# Patient Record
Sex: MALE | Race: OTHER | URBAN - METROPOLITAN AREA
[De-identification: names, ages, dates, MRNs, and addresses within clinical notes are randomized per-mention and may not be internally consistent; named-entity substitution may affect disease eponyms.]

---

## 2018-06-01 ENCOUNTER — OUTPATIENT (OUTPATIENT)
Dept: OUTPATIENT SERVICES | Facility: HOSPITAL | Age: 23
LOS: 1 days | End: 2018-06-01
Payer: MEDICAID

## 2018-06-01 PROCEDURE — G9001: CPT

## 2018-06-08 DIAGNOSIS — R69 ILLNESS, UNSPECIFIED: ICD-10-CM

## 2018-07-01 ENCOUNTER — OUTPATIENT (OUTPATIENT)
Dept: OUTPATIENT SERVICES | Facility: HOSPITAL | Age: 23
LOS: 1 days | End: 2018-07-01

## 2018-07-17 DIAGNOSIS — Z71.89 OTHER SPECIFIED COUNSELING: ICD-10-CM

## 2021-04-21 ENCOUNTER — OUTPATIENT (OUTPATIENT)
Dept: OUTPATIENT SERVICES | Facility: HOSPITAL | Age: 26
LOS: 1 days | Discharge: HOME | End: 2021-04-21
Payer: MEDICARE

## 2021-04-21 DIAGNOSIS — M25.532 PAIN IN LEFT WRIST: ICD-10-CM

## 2021-04-21 PROCEDURE — 73110 X-RAY EXAM OF WRIST: CPT | Mod: 26,LT

## 2021-12-01 ENCOUNTER — APPOINTMENT (OUTPATIENT)
Dept: FAMILY MEDICINE | Facility: CLINIC | Age: 26
End: 2021-12-01
Payer: COMMERCIAL

## 2021-12-01 ENCOUNTER — NON-APPOINTMENT (OUTPATIENT)
Age: 26
End: 2021-12-01

## 2021-12-01 VITALS
HEIGHT: 68 IN | HEART RATE: 105 BPM | BODY MASS INDEX: 22.77 KG/M2 | TEMPERATURE: 97.3 F | DIASTOLIC BLOOD PRESSURE: 82 MMHG | SYSTOLIC BLOOD PRESSURE: 142 MMHG | WEIGHT: 150.25 LBS

## 2021-12-01 DIAGNOSIS — Z13.29 ENCOUNTER FOR SCREENING FOR OTHER SUSPECTED ENDOCRINE DISORDER: ICD-10-CM

## 2021-12-01 PROCEDURE — 99385 PREV VISIT NEW AGE 18-39: CPT | Mod: 25

## 2021-12-01 PROCEDURE — 36415 COLL VENOUS BLD VENIPUNCTURE: CPT

## 2021-12-01 PROCEDURE — 93000 ELECTROCARDIOGRAM COMPLETE: CPT | Mod: 59

## 2021-12-01 PROCEDURE — 99203 OFFICE O/P NEW LOW 30 MIN: CPT | Mod: 25

## 2021-12-01 NOTE — HISTORY OF PRESENT ILLNESS
[FreeTextEntry1] : Establish care [de-identified] : Mr. MATTHEW MORRISON is a pleasant 26 year old male with PMH of HTN who comes in to the office for CPE and establish care. He was diagnosed with HTN whern he was 16 years old, got tests done in the past but I don't have those records. Patient was on lisinopril, last time 8 years ago. His blood pressures at home have been: SBP <140 but diastolic have been intermittently > 90. Patient doesn't bring the log\par His mother and maternal grandmother have HTN\par No other complaints today.\par \par Previous PC: ?\par

## 2021-12-01 NOTE — HEALTH RISK ASSESSMENT
[No] : In the past 12 months have you used drugs other than those required for medical reasons? 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

## 2021-12-01 NOTE — PHYSICAL EXAM
[No Acute Distress] : no acute distress [Well Nourished] : well nourished [Well Developed] : well developed [Well-Appearing] : well-appearing [Normal Voice/Communication] : normal voice/communication [Normal Sclera/Conjunctiva] : normal sclera/conjunctiva [PERRL] : pupils equal round and reactive to light [EOMI] : extraocular movements intact [Normal Outer Ear/Nose] : the outer ears and nose were normal in appearance [Normal Oropharynx] : the oropharynx was normal [No JVD] : no jugular venous distention [No Lymphadenopathy] : no lymphadenopathy [Supple] : supple [Thyroid Normal, No Nodules] : the thyroid was normal and there were no nodules present [No Respiratory Distress] : no respiratory distress  [No Accessory Muscle Use] : no accessory muscle use [Clear to Auscultation] : lungs were clear to auscultation bilaterally [Normal Rate] : normal rate  [Regular Rhythm] : with a regular rhythm [Normal S1, S2] : normal S1 and S2 [No Murmur] : no murmur heard [No Carotid Bruits] : no carotid bruits [No Abdominal Bruit] : a ~M bruit was not heard ~T in the abdomen [No Varicosities] : no varicosities [Pedal Pulses Present] : the pedal pulses are present [No Edema] : there was no peripheral edema [No Palpable Aorta] : no palpable aorta [No Extremity Clubbing/Cyanosis] : no extremity clubbing/cyanosis [Soft] : abdomen soft [Non Tender] : non-tender [Non-distended] : non-distended [No Masses] : no abdominal mass palpated [No HSM] : no HSM [Normal Bowel Sounds] : normal bowel sounds [Normal Posterior Cervical Nodes] : no posterior cervical lymphadenopathy [Normal Anterior Cervical Nodes] : no anterior cervical lymphadenopathy [No CVA Tenderness] : no CVA  tenderness [No Spinal Tenderness] : no spinal tenderness [No Joint Swelling] : no joint swelling [Grossly Normal Strength/Tone] : grossly normal strength/tone [No Rash] : no rash [Coordination Grossly Intact] : coordination grossly intact [No Focal Deficits] : no focal deficits [Normal Gait] : normal gait [Deep Tendon Reflexes (DTR)] : deep tendon reflexes were 2+ and symmetric [Normal Affect] : the affect was normal [Normal Insight/Judgement] : insight and judgment were intact [Normal TMs] : both tympanic membranes were normal [Normal Nasal Mucosa] : the nasal mucosa was normal [Urethral Meatus] : meatus normal [Penis Abnormality] : normal circumcised penis [Scrotum] : the scrotum was normal [Testes Tenderness] : no tenderness of the testes [Testes Mass (___cm)] : there were no testicular masses [Speech Grossly Normal] : speech grossly normal [Normal Mood] : the mood was normal

## 2021-12-02 LAB
ALBUMIN SERPL ELPH-MCNC: 5.1 G/DL
ALP BLD-CCNC: 48 U/L
ALT SERPL-CCNC: 17 U/L
ANION GAP SERPL CALC-SCNC: 16 MMOL/L
APPEARANCE: CLEAR
AST SERPL-CCNC: 16 U/L
BACTERIA: NEGATIVE
BASOPHILS # BLD AUTO: 0.04 K/UL
BASOPHILS NFR BLD AUTO: 0.8 %
BILIRUB SERPL-MCNC: 1 MG/DL
BILIRUBIN URINE: NEGATIVE
BLOOD URINE: NEGATIVE
BUN SERPL-MCNC: 13 MG/DL
CALCIUM SERPL-MCNC: 9.6 MG/DL
CHLORIDE SERPL-SCNC: 102 MMOL/L
CHOLEST SERPL-MCNC: 187 MG/DL
CO2 SERPL-SCNC: 22 MMOL/L
COLOR: YELLOW
CREAT SERPL-MCNC: 1.06 MG/DL
CREAT SPEC-SCNC: 346 MG/DL
EOSINOPHIL # BLD AUTO: 0.22 K/UL
EOSINOPHIL NFR BLD AUTO: 4.7 %
ESTIMATED AVERAGE GLUCOSE: 100 MG/DL
GLUCOSE QUALITATIVE U: NEGATIVE
GLUCOSE SERPL-MCNC: 77 MG/DL
HBA1C MFR BLD HPLC: 5.1 %
HCT VFR BLD CALC: 49.6 %
HCV AB SER QL: NONREACTIVE
HCV S/CO RATIO: 0.17 S/CO
HDLC SERPL-MCNC: 75 MG/DL
HGB BLD-MCNC: 15.8 G/DL
HIV1+2 AB SPEC QL IA.RAPID: NONREACTIVE
HYALINE CASTS: 2 /LPF
IMM GRANULOCYTES NFR BLD AUTO: 0.2 %
KETONES URINE: NEGATIVE
LDLC SERPL CALC-MCNC: 98 MG/DL
LEUKOCYTE ESTERASE URINE: NEGATIVE
LYMPHOCYTES # BLD AUTO: 1.59 K/UL
LYMPHOCYTES NFR BLD AUTO: 33.7 %
MAN DIFF?: NORMAL
MCHC RBC-ENTMCNC: 31.3 PG
MCHC RBC-ENTMCNC: 31.9 GM/DL
MCV RBC AUTO: 98.2 FL
MICROALBUMIN 24H UR DL<=1MG/L-MCNC: <1.2 MG/DL
MICROALBUMIN/CREAT 24H UR-RTO: NORMAL MG/G
MICROSCOPIC-UA: NORMAL
MONOCYTES # BLD AUTO: 0.3 K/UL
MONOCYTES NFR BLD AUTO: 6.4 %
NEUTROPHILS # BLD AUTO: 2.56 K/UL
NEUTROPHILS NFR BLD AUTO: 54.2 %
NITRITE URINE: NEGATIVE
NONHDLC SERPL-MCNC: 111 MG/DL
PH URINE: 6.5
PLATELET # BLD AUTO: 205 K/UL
POTASSIUM SERPL-SCNC: 4.6 MMOL/L
PROT SERPL-MCNC: 7.6 G/DL
PROTEIN URINE: NORMAL
RBC # BLD: 5.05 M/UL
RBC # FLD: 13.2 %
RED BLOOD CELLS URINE: 1 /HPF
SODIUM SERPL-SCNC: 140 MMOL/L
SPECIFIC GRAVITY URINE: 1.03
SQUAMOUS EPITHELIAL CELLS: 0 /HPF
TRIGL SERPL-MCNC: 70 MG/DL
TSH SERPL-ACNC: 1.1 UIU/ML
UROBILINOGEN URINE: NORMAL
WBC # FLD AUTO: 4.72 K/UL
WHITE BLOOD CELLS URINE: 1 /HPF

## 2021-12-08 ENCOUNTER — OUTPATIENT (OUTPATIENT)
Dept: OUTPATIENT SERVICES | Facility: HOSPITAL | Age: 26
LOS: 1 days | End: 2021-12-08

## 2021-12-08 ENCOUNTER — APPOINTMENT (OUTPATIENT)
Dept: ULTRASOUND IMAGING | Facility: CLINIC | Age: 26
End: 2021-12-08
Payer: COMMERCIAL

## 2021-12-08 DIAGNOSIS — I10 ESSENTIAL (PRIMARY) HYPERTENSION: ICD-10-CM

## 2021-12-08 PROCEDURE — 93975 VASCULAR STUDY: CPT | Mod: 26

## 2021-12-16 ENCOUNTER — NON-APPOINTMENT (OUTPATIENT)
Age: 26
End: 2021-12-16

## 2021-12-16 ENCOUNTER — APPOINTMENT (OUTPATIENT)
Dept: FAMILY MEDICINE | Facility: CLINIC | Age: 26
End: 2021-12-16
Payer: COMMERCIAL

## 2021-12-16 VITALS
HEIGHT: 68 IN | WEIGHT: 149 LBS | DIASTOLIC BLOOD PRESSURE: 90 MMHG | SYSTOLIC BLOOD PRESSURE: 148 MMHG | BODY MASS INDEX: 22.58 KG/M2

## 2021-12-16 DIAGNOSIS — F43.9 REACTION TO SEVERE STRESS, UNSPECIFIED: ICD-10-CM

## 2021-12-16 PROCEDURE — 93000 ELECTROCARDIOGRAM COMPLETE: CPT

## 2021-12-16 PROCEDURE — 99215 OFFICE O/P EST HI 40 MIN: CPT | Mod: 25

## 2021-12-16 NOTE — ASSESSMENT
[FreeTextEntry1] : \par In regards to hypertension\par Patient's blood pressure borderline elevated and reports that is intermittently high at home too.\par DIscussed avoid using salt, monitoring his blood pressures at home and bring the log on next visit.\par Continue amlodipine  \par Unremarkable Artery US,  UA, urine creat/alb ratio\par Advised to seek medical help emergently if systolic blood pressures (bottom number) is > 180 or diastolic blood pressure (bottom number) is >100  or if patient has  symptoms that include but are not limited to constant headaches, chest pain, palpitations, dizziness, lightheadedness\par \par Abnormal EKG reviewed: non specific abnormal T-waves, no abnormal ST changes. Asymptomatic.  Alarm symptoms discussed  including but not limited to chest pain, palpitations and I advised the patient to go to the emergency department if these symptoms appear. \par Referring to cardiology\par .\par \par Adjustment disorder with anxiety and stress\par Patient reports that for the past 4 months since he moved back to [place]\par Seems related to leaving away form his family. \par His PHQ-2 is 0 today\par Denies suicidal thoughts\par Could be related to his previous difficult childhood and teenager years?\par Offered therapy and/or medications, patient is note interested at this time. Explained that CBD is a good option for his current symptoms. Patient would rather to come back for following up at the office and will consider other options at a alter time\par Advised to find stress coping measures (exercise, yoga, etc)\par \par Return to care: within 1 month for follow up in anxiety/HTN or earlier if needed\par Call or return for any questions \par

## 2021-12-16 NOTE — HISTORY OF PRESENT ILLNESS
[FreeTextEntry1] : follow up [de-identified] : Mr. MATTHEW MORRISON is a pleasant 26 year old male with PMH of HTN who comes in to the office for following up in HTN and repeat EKG. He was diagnosed with HTN when he was 16 years old, had work up done but I don't have those records.\par I prescribed him amlodipine 2.5 mg last week, he was advised to increase it to 5 mg if his blood pressures were still high on the medication, which he did. He has been monitoring his blood pressures and have been at times in range and some times out of range.\par Patient was on lisinopril, last time 8 years ago.\par His mother and maternal grandmother have HTN\par His EKG was abnormal, he was asymptomatic. \par \par Patient reports that has been feeling anxious an stressed out for the past 4 months since he moved back to [place]. His mom and sister live in [place]. Denies feeling depressed, his PHQ-2 was negative last week. Denies suicidal thoughts. He reports that had a difficult childhood and teenager years with his step father, when he believes his blood pressure issues started.\par \par No other complaints today.\par \par Previous PC: ?

## 2021-12-16 NOTE — HEALTH RISK ASSESSMENT
[0] : 2) Feeling down, depressed, or hopeless: Not at all (0) [PHQ-2 Negative - No further assessment needed] : PHQ-2 Negative - No further assessment needed [[id number]] : 0

## 2021-12-27 ENCOUNTER — RX CHANGE (OUTPATIENT)
Age: 26
End: 2021-12-27

## 2022-01-12 ENCOUNTER — APPOINTMENT (OUTPATIENT)
Dept: FAMILY MEDICINE | Facility: CLINIC | Age: 27
End: 2022-01-12
Payer: COMMERCIAL

## 2022-01-12 VITALS
WEIGHT: 152 LBS | DIASTOLIC BLOOD PRESSURE: 70 MMHG | HEIGHT: 68 IN | BODY MASS INDEX: 23.04 KG/M2 | SYSTOLIC BLOOD PRESSURE: 120 MMHG

## 2022-01-12 PROCEDURE — 99214 OFFICE O/P EST MOD 30 MIN: CPT

## 2022-01-12 NOTE — ASSESSMENT
[FreeTextEntry1] : \par In regards to hypertension\par Patient's blood pressure borderline elevated and reports that is intermittently high at home too.\par DIscussed avoid using salt, monitoring his blood pressures at home and bring the log on next visit.\par Continue amlodipine  \par Pending to see cardio for abnormal EKG, has appointment for feb/2022\par \par Adjustment disorder with anxiety and stress\par Patient reports that for the past 4 months since he moved back to [place]\par Seems related to leaving away form his family. \par His PHQ-2 is 0 today\par Denies suicidal thoughts\par Could be related to his previous difficult childhood and teenager years?\par Offered again therapy and/or medications, patient is not interested at this time. Offered to follow up at the office instead\par Advised to find stress coping measures (exercise, yoga, etc)\par \par Return to care: within 3 months for follow up in anxiety/HTN or earlier if needed\par Call or return for any questions

## 2022-01-12 NOTE — PHYSICAL EXAM
[Coordination Grossly Intact] : coordination grossly intact [No Focal Deficits] : no focal deficits [Normal Gait] : normal gait [Speech Grossly Normal] : speech grossly normal [Normal Mood] : the mood was normal [Normal] : affect was normal and insight and judgment were intact

## 2022-01-12 NOTE — HISTORY OF PRESENT ILLNESS
[FreeTextEntry1] : follow up [de-identified] : Mr. MATTHEW MORRISON is a pleasant 26 year old male with PMH of HTN, adjustment disorder who comes in to the office for following up in HTN  He was started on amlodipine now titrated to 5 mg QD, he is tolerating it well. No complaints today. Denies suicidal thought.\par Pending to see cardio for abnormal EKG\par \par \par \par His mother and maternal grandmother have HTN\par His EKG was abnormal, he was asymptomatic. \par \par Patient reports that has been feeling anxious an stressed out for the past 4 months since he moved back to [place]. His mom and sister live in [place]. Denies feeling depressed, his PHQ-2 was negative last week. Denies suicidal thoughts. He reports that had a difficult childhood and teenager years with his step father, when he believes his blood pressure issues started.\par \par No other complaints today.\par \par Previous PC: ?

## 2022-01-12 NOTE — HEALTH RISK ASSESSMENT
[0] : 2) Feeling down, depressed, or hopeless: Not at all (0) [PHQ-2 Negative - No further assessment needed] : PHQ-2 Negative - No further assessment needed [[id number]] : 0

## 2022-02-09 ENCOUNTER — NON-APPOINTMENT (OUTPATIENT)
Age: 27
End: 2022-02-09

## 2022-02-09 ENCOUNTER — APPOINTMENT (OUTPATIENT)
Dept: CARDIOLOGY | Facility: CLINIC | Age: 27
End: 2022-02-09
Payer: COMMERCIAL

## 2022-02-09 VITALS
HEART RATE: 68 BPM | WEIGHT: 157 LBS | BODY MASS INDEX: 23.79 KG/M2 | RESPIRATION RATE: 16 BRPM | OXYGEN SATURATION: 98 % | DIASTOLIC BLOOD PRESSURE: 82 MMHG | SYSTOLIC BLOOD PRESSURE: 140 MMHG | HEIGHT: 68 IN

## 2022-02-09 DIAGNOSIS — Z13.1 ENCOUNTER FOR SCREENING FOR DIABETES MELLITUS: ICD-10-CM

## 2022-02-09 DIAGNOSIS — R94.31 ABNORMAL ELECTROCARDIOGRAM [ECG] [EKG]: ICD-10-CM

## 2022-02-09 DIAGNOSIS — Z82.49 FAMILY HISTORY OF ISCHEMIC HEART DISEASE AND OTHER DISEASES OF THE CIRCULATORY SYSTEM: ICD-10-CM

## 2022-02-09 DIAGNOSIS — Z13.220 ENCOUNTER FOR SCREENING FOR LIPOID DISORDERS: ICD-10-CM

## 2022-02-09 DIAGNOSIS — Z11.59 ENCOUNTER FOR SCREENING FOR OTHER VIRAL DISEASES: ICD-10-CM

## 2022-02-09 DIAGNOSIS — Z13.6 ENCOUNTER FOR SCREENING FOR CARDIOVASCULAR DISORDERS: ICD-10-CM

## 2022-02-09 DIAGNOSIS — Z11.4 ENCOUNTER FOR SCREENING FOR HUMAN IMMUNODEFICIENCY VIRUS [HIV]: ICD-10-CM

## 2022-02-09 PROCEDURE — 99244 OFF/OP CNSLTJ NEW/EST MOD 40: CPT

## 2022-02-09 PROCEDURE — 93000 ELECTROCARDIOGRAM COMPLETE: CPT

## 2022-02-09 NOTE — HISTORY OF PRESENT ILLNESS
[FreeTextEntry1] : Patient presents today for evaluation because he was recently seen by his new primary doctor and was noted to have an abnormal EKG and elevated blood pressure.  Patient apparently has had issues with hypertension for a number of years.  He was previously on medication but this had been stopped a long time ago.  His blood pressure was elevated in the office at his primary and he was started on amlodipine which was since increased to 5 mg.  He reports blood pressures in the 130s to 140s systolic when he checked at home prior to taking the medication.  He has not been checking it recently because he said he was getting very nervous when he checked it and it was high so he was told to stop checking it.  He has made a lot of changes to his diet over the last couple of months and has been exercising regularly and doing as much as he can to help his blood pressure nonpharmacologically.  He feels very well when he goes to the gym and exercise and has no symptoms or limitations.  He apparently had some kind of work-up in the past with regards to his EKG and his blood pressure but does not know who the doctor was in we are unable to get records.  Patient denies chest pain, shortness of breath, palpitations, orthopnea, presyncope, syncope.

## 2022-02-09 NOTE — ASSESSMENT
[FreeTextEntry1] : Renal Doppler December 8, 2021 showed no evidence of renal artery stenosis.\par \par EKG: Sinus rhythm with nonspecific T wave changes, predominantly biphasic T waves in the anterolateral and inferior leads.\par \par 27-year-old man with a past medical history of hypertension with a family history of hypertension who presents to me for evaluation of his elevated blood pressure and an abnormal EKG.  EKG findings may be consistent with his athleticism and an athletic heart.  Further evaluation to rule out any underlying cardiovascular disease is certainly important.  Blood pressure is a bit elevated here today but his medication was recently increased.  I will not make any additional changes for now but have asked him to start monitoring it at home.  He will take 2 readings and use the second reading.  Lipids are very well controlled and his blood work is otherwise unremarkable.  Ultrasound of the kidneys showed no evidence of renal artery stenosis.

## 2022-02-09 NOTE — DISCUSSION/SUMMARY
[FreeTextEntry1] : 1.  Check echocardiogram/stress test to evaluate his abnormal EKG.\par 2.  Continue amlodipine alone for hypertension for now.\par 3.  Monitor BP at home, keep a log and bring to f/u.\par 4.  Patient strongly encouraged on reducing salt intake.\par 5.  Encourage patient to continue with all of his healthy diet and exercise habits.\par 6.  Follow up here in two months.

## 2022-03-24 ENCOUNTER — APPOINTMENT (OUTPATIENT)
Dept: CARDIOLOGY | Facility: CLINIC | Age: 27
End: 2022-03-24
Payer: COMMERCIAL

## 2022-03-24 PROCEDURE — 93306 TTE W/DOPPLER COMPLETE: CPT

## 2022-03-24 PROCEDURE — 93015 CV STRESS TEST SUPVJ I&R: CPT

## 2022-04-05 ENCOUNTER — APPOINTMENT (OUTPATIENT)
Dept: CARDIOLOGY | Facility: CLINIC | Age: 27
End: 2022-04-05
Payer: COMMERCIAL

## 2022-04-05 VITALS
BODY MASS INDEX: 23.79 KG/M2 | RESPIRATION RATE: 16 BRPM | HEIGHT: 68 IN | DIASTOLIC BLOOD PRESSURE: 88 MMHG | WEIGHT: 157 LBS | SYSTOLIC BLOOD PRESSURE: 153 MMHG | HEART RATE: 77 BPM

## 2022-04-05 PROCEDURE — 99213 OFFICE O/P EST LOW 20 MIN: CPT

## 2022-04-05 NOTE — ASSESSMENT
[FreeTextEntry1] : Renal Doppler December 8, 2021 showed no evidence of renal artery stenosis.\par \par Echocardiogram March 24, 2022 demonstrated left ventricle normal size and function with ejection fraction of 55 to 60%.  Mild tricuspid regurgitation.  No significant structural abnormalities.\par \par Exercise stress test March 24, 2022 during which the patient exercised via a Chace protocol for 13 minutes and 20 seconds, totaling 14 METS.  Peak heart rate achieved was 184 bpm, presenting 96% of age-predicted maximum.  Blood pressure response to exercise was normal.  EKG showed horizontal ST depressions in the inferior leads concerning for ischemia with exercise.\par \par 27-year-old man with a past medical history of hypertension with a family history of hypertension who presented to me for evaluation of his elevated blood pressure and an abnormal EKG.   echocardiogram is unremarkable with a normal EF and no significant valve disease.  Stress testing shows an excellent exercise capacity but some horizontal ST depressions with exercise.  I have recommended stress echocardiogram to fully rule out ischemia which remains very unlikely.  Blood pressure at home appears to be controlled on amlodipine and I would not make any changes to his medication for now.  I have encouraged him to maintain all of his healthy habits.

## 2022-04-05 NOTE — DISCUSSION/SUMMARY
[FreeTextEntry1] : 1.  Check stress echocardiogram to evaluate ischemia given his abnormal stress test.\par 2.  Continue amlodipine alone for hypertension for now.\par 3.  Monitor BP at home, keep a log and bring to f/u.\par 4.  Patient strongly encouraged on reducing salt intake.\par 5.  Encourage patient to continue with all of his healthy diet and exercise habits.\par 6.  Follow up here in one year or as needed.

## 2022-04-05 NOTE — HISTORY OF PRESENT ILLNESS
[FreeTextEntry1] : Patient presents back today feeling generally well.  He does state that he occasionally notes some very brief palpitations that will occur.  This occurs once or twice a week and last for a second or 2 feeling like a few skipped beats.  Generally he feels like it happens when he is sitting or crouching.  There were no associated symptoms.  No dizziness or lightheadedness.  He reports no other physical symptoms at all.  Blood pressure at home of been in the 120s over 70s and he is tolerating amlodipine.  Patient denies chest pain, shortness of breath, orthopnea, presyncope, syncope.

## 2022-05-12 ENCOUNTER — APPOINTMENT (OUTPATIENT)
Dept: CARDIOLOGY | Facility: CLINIC | Age: 27
End: 2022-05-12
Payer: COMMERCIAL

## 2022-05-12 PROCEDURE — 93351 STRESS TTE COMPLETE: CPT

## 2022-05-12 RX ORDER — PERFLUTREN 6.52 MG/ML
6.52 INJECTION, SUSPENSION INTRAVENOUS
Qty: 4 | Refills: 0 | Status: COMPLETED | OUTPATIENT
Start: 2022-05-12

## 2022-05-12 RX ADMIN — PERFLUTREN MG/ML: 6.52 INJECTION, SUSPENSION INTRAVENOUS at 00:00

## 2022-05-19 ENCOUNTER — APPOINTMENT (OUTPATIENT)
Dept: FAMILY MEDICINE | Facility: CLINIC | Age: 27
End: 2022-05-19

## 2022-06-01 ENCOUNTER — RX RENEWAL (OUTPATIENT)
Age: 27
End: 2022-06-01

## 2022-06-15 ENCOUNTER — NON-APPOINTMENT (OUTPATIENT)
Age: 27
End: 2022-06-15

## 2022-09-06 ENCOUNTER — RX RENEWAL (OUTPATIENT)
Age: 27
End: 2022-09-06

## 2022-10-10 ENCOUNTER — RX CHANGE (OUTPATIENT)
Age: 27
End: 2022-10-10

## 2022-10-20 ENCOUNTER — APPOINTMENT (OUTPATIENT)
Dept: FAMILY MEDICINE | Facility: CLINIC | Age: 27
End: 2022-10-20

## 2022-10-20 VITALS
HEIGHT: 68 IN | HEART RATE: 79 BPM | SYSTOLIC BLOOD PRESSURE: 161 MMHG | BODY MASS INDEX: 24.1 KG/M2 | WEIGHT: 159 LBS | DIASTOLIC BLOOD PRESSURE: 82 MMHG

## 2022-10-20 PROCEDURE — 99214 OFFICE O/P EST MOD 30 MIN: CPT

## 2022-10-20 NOTE — HISTORY OF PRESENT ILLNESS
[FreeTextEntry1] : follow up [de-identified] : Mr. MATTHEW MORRISON is a pleasant 26 year old male with PMH of HTN, adjustment disorder who comes in to the office for following up in HTN  He is on amlodipine 5 mg QD, he is tolerating it well but has not followed up at this office since Jan/2022. Denies chest pain, headaches, shortness of breath, leg welling.\par No complaints today. Denies suicidal thought.\par Saw cardio for abnormal EKG, was told to continue current regimen.\par \par His adjustment disorder is resolved now, denies feeling depressed or suicidal thoughts.\par Previous PCP: ?\par Cardio: Yg

## 2022-10-20 NOTE — PLAN
[FreeTextEntry1] : \par In regards to hypertension\par Patient's blood pressure in target today\par DIscussed avoid using salt, monitoring his blood pressures at home and bring the log on next visit.\par Continue amlodipine 5 mg QD\par Advised to seek medical help emergently if systolic blood pressures (bottom number) is > 180 or diastolic blood pressure (bottom number) is >100  or if patient has  symptoms that include but are not limited to constant headaches, chest pain, palpitations, dizziness, lightheadedness\par \par Saw cardio for abnormal EKG, was told that everything was Ok and to follow up 1 year later\par \par Adjustment disorder with anxiety and stress\par Resolved\par His PHQ-2 is 0 today\par Denies suicidal thoughts\par \par Return to care: within 3 months for follow up in anxiety/HTN or earlier if needed\par Call or return for any questions

## 2022-10-20 NOTE — HEALTH RISK ASSESSMENT
[0] : 2) Feeling down, depressed, or hopeless: Not at all (0) [PHQ-2 Negative - No further assessment needed] : PHQ-2 Negative - No further assessment needed [[id number]] : 0

## 2022-12-05 ENCOUNTER — APPOINTMENT (OUTPATIENT)
Dept: FAMILY MEDICINE | Facility: CLINIC | Age: 27
End: 2022-12-05

## 2022-12-05 VITALS
DIASTOLIC BLOOD PRESSURE: 77 MMHG | HEIGHT: 68 IN | SYSTOLIC BLOOD PRESSURE: 137 MMHG | BODY MASS INDEX: 24.25 KG/M2 | HEART RATE: 97 BPM | RESPIRATION RATE: 16 BRPM | WEIGHT: 160 LBS

## 2022-12-05 DIAGNOSIS — Z23 ENCOUNTER FOR IMMUNIZATION: ICD-10-CM

## 2022-12-05 PROCEDURE — 90471 IMMUNIZATION ADMIN: CPT

## 2022-12-05 PROCEDURE — 36415 COLL VENOUS BLD VENIPUNCTURE: CPT

## 2022-12-05 PROCEDURE — 99395 PREV VISIT EST AGE 18-39: CPT | Mod: 25

## 2022-12-05 PROCEDURE — 90715 TDAP VACCINE 7 YRS/> IM: CPT

## 2022-12-05 NOTE — PLAN
[FreeTextEntry1] : \par In regards annual physical exam: \par Advised to use sunscreen \par GIven Tdap today, tolerated well\par Ordering CBC, CMP\par Depression screen: PHQ-2 test done, < 2 as noted above\par AUDIT-C test done, negative as noted above\par Advised to see optometrist once at year and dentist every 6 months \par \par In regards to hypertension\par Patient's blood pressure in adequate range. \par DIscussed avoid using salt, monitoring his blood pressures at home and bring the log on next visit.\par Continue Amlodipine 5 mg \par \par Adjustment disorder\par Symptoms resolved now, denies suicidal thoughts or depression symptoms at this time\par \par Return to care: within 3 months for follow up or earlier if needed\par Call or return for any questions

## 2022-12-05 NOTE — HEALTH RISK ASSESSMENT
[Never] : Never [No] : In the past 12 months have you used drugs other than those required for medical reasons? 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

## 2022-12-05 NOTE — HISTORY OF PRESENT ILLNESS
[FreeTextEntry1] : AWE [de-identified] : Mr. MATTHEW MORRISON is a pleasant 27 year old male with PMH of HTN, adjustment disorder who comes in to the office for AWE. Denies chest pain, headaches, shortness of breath, leg welling.\par No complaints today. Denies suicidal thought.\par Saw cardio for abnormal EKG, was told to continue current regimen.\par \par His adjustment disorder is resolved now, denies feeling depressed or suicidal thoughts.\par Previous PCP: ?\par Cardio: Yg

## 2022-12-05 NOTE — PHYSICAL EXAM
[No Acute Distress] : no acute distress [Well Nourished] : well nourished [Well Developed] : well developed [Well-Appearing] : well-appearing [Normal Sclera/Conjunctiva] : normal sclera/conjunctiva [PERRL] : pupils equal round and reactive to light [EOMI] : extraocular movements intact [Normal Outer Ear/Nose] : the outer ears and nose were normal in appearance [Normal Oropharynx] : the oropharynx was normal [No JVD] : no jugular venous distention [No Lymphadenopathy] : no lymphadenopathy [Supple] : supple [Thyroid Normal, No Nodules] : the thyroid was normal and there were no nodules present [No Respiratory Distress] : no respiratory distress  [No Accessory Muscle Use] : no accessory muscle use [Clear to Auscultation] : lungs were clear to auscultation bilaterally [Normal Rate] : normal rate  [Regular Rhythm] : with a regular rhythm [Normal S1, S2] : normal S1 and S2 [No Murmur] : no murmur heard [No Carotid Bruits] : no carotid bruits [No Abdominal Bruit] : a ~M bruit was not heard ~T in the abdomen [No Varicosities] : no varicosities [Pedal Pulses Present] : the pedal pulses are present [No Edema] : there was no peripheral edema [No Palpable Aorta] : no palpable aorta [No Extremity Clubbing/Cyanosis] : no extremity clubbing/cyanosis [Soft] : abdomen soft [Non Tender] : non-tender [Non-distended] : non-distended [No Masses] : no abdominal mass palpated [No HSM] : no HSM [Normal Bowel Sounds] : normal bowel sounds [Normal Posterior Cervical Nodes] : no posterior cervical lymphadenopathy [Normal Anterior Cervical Nodes] : no anterior cervical lymphadenopathy [No CVA Tenderness] : no CVA  tenderness [No Spinal Tenderness] : no spinal tenderness [No Joint Swelling] : no joint swelling [Grossly Normal Strength/Tone] : grossly normal strength/tone [No Rash] : no rash [Coordination Grossly Intact] : coordination grossly intact [No Focal Deficits] : no focal deficits [Normal Gait] : normal gait [Deep Tendon Reflexes (DTR)] : deep tendon reflexes were 2+ and symmetric [Normal Affect] : the affect was normal [Normal Insight/Judgement] : insight and judgment were intact [de-identified] : some wax in right ear, non obstructing the ear canal [de-identified] : declined

## 2022-12-06 LAB
ALBUMIN SERPL ELPH-MCNC: 4.8 G/DL
ALP BLD-CCNC: 58 U/L
ALT SERPL-CCNC: 12 U/L
ANION GAP SERPL CALC-SCNC: 12 MMOL/L
AST SERPL-CCNC: 17 U/L
BASOPHILS # BLD AUTO: 0.02 K/UL
BASOPHILS NFR BLD AUTO: 0.4 %
BILIRUB SERPL-MCNC: 0.9 MG/DL
BUN SERPL-MCNC: 13 MG/DL
CALCIUM SERPL-MCNC: 9.6 MG/DL
CHLORIDE SERPL-SCNC: 102 MMOL/L
CO2 SERPL-SCNC: 26 MMOL/L
CREAT SERPL-MCNC: 1.12 MG/DL
EGFR: 91 ML/MIN/1.73M2
EOSINOPHIL # BLD AUTO: 0.16 K/UL
EOSINOPHIL NFR BLD AUTO: 3.6 %
GLUCOSE SERPL-MCNC: 77 MG/DL
HCT VFR BLD CALC: 50 %
HGB BLD-MCNC: 15.9 G/DL
IMM GRANULOCYTES NFR BLD AUTO: 0.2 %
LYMPHOCYTES # BLD AUTO: 1.88 K/UL
LYMPHOCYTES NFR BLD AUTO: 41.9 %
MAN DIFF?: NORMAL
MCHC RBC-ENTMCNC: 31.2 PG
MCHC RBC-ENTMCNC: 31.8 GM/DL
MCV RBC AUTO: 98.2 FL
MONOCYTES # BLD AUTO: 0.32 K/UL
MONOCYTES NFR BLD AUTO: 7.1 %
NEUTROPHILS # BLD AUTO: 2.1 K/UL
NEUTROPHILS NFR BLD AUTO: 46.8 %
PLATELET # BLD AUTO: 173 K/UL
POTASSIUM SERPL-SCNC: 3.8 MMOL/L
PROT SERPL-MCNC: 7.4 G/DL
RBC # BLD: 5.09 M/UL
RBC # FLD: 12.6 %
SODIUM SERPL-SCNC: 140 MMOL/L
WBC # FLD AUTO: 4.49 K/UL

## 2022-12-21 NOTE — ASSESSMENT
[FreeTextEntry1] : \par In regards annual physical exam: \par Declines Flu and Tdap vaccines\par Ordering CBC, CMP, Hep C, HIV\par PHQ-2 test done, < 2 as noted above\par AUDIT-C test done, negative as noted above\par Advised to see optometrist once at year and dentist every 6 months \par \par In regards to hypertension\par Patient's blood pressure borderline elevated and reports that is intermittently high at home too.\par DIscussed avoid using salt, monitoring his blood pressures at home and bring the log on next visit.\par Abnormal EKG reviewed: non specific T=wave abnormalities but no acute ST abnormalities, patient without cardiac symptoms. \par Will start in lisinopril and will reassess within 1 week, will repeat EKG\par Checking a kidney artery US, UA, urine creat/alb ratio\par Advised to seek medical help emergently if systolic blood pressures (bottom number) is > 180 or < 100 or diastolic blood pressure (bottom number) is >100 or <60 or if patient has  symptoms that include but are not limited to constant headaches, chest pain, palpitations, dizziness, lightheadedness.\par \par Return to care: within 1 week for follow up in HTN or earlier if needed\par Call or return for any questions \par   called podiatry resident; spoke to Dr. Camargo Patient has a BP of 93/53. From this afternoon, this pt's BP has been decreasing. Patient has a low BP of 86/46. Pt has been declining from a BP of 93/53.

## 2023-03-06 ENCOUNTER — APPOINTMENT (OUTPATIENT)
Dept: FAMILY MEDICINE | Facility: CLINIC | Age: 28
End: 2023-03-06
Payer: COMMERCIAL

## 2023-03-06 VITALS
BODY MASS INDEX: 24.25 KG/M2 | SYSTOLIC BLOOD PRESSURE: 134 MMHG | HEIGHT: 68 IN | HEART RATE: 75 BPM | WEIGHT: 160 LBS | DIASTOLIC BLOOD PRESSURE: 85 MMHG

## 2023-03-06 PROCEDURE — 99213 OFFICE O/P EST LOW 20 MIN: CPT

## 2023-03-06 NOTE — PHYSICAL EXAM
[No Acute Distress] : no acute distress [Well Nourished] : well nourished [Well Developed] : well developed [Well-Appearing] : well-appearing [Normal Outer Ear/Nose] : the outer ears and nose were normal in appearance [Normal Oropharynx] : the oropharynx was normal [No Respiratory Distress] : no respiratory distress  [No Accessory Muscle Use] : no accessory muscle use [Clear to Auscultation] : lungs were clear to auscultation bilaterally [Normal Rate] : normal rate  [Regular Rhythm] : with a regular rhythm [Normal S1, S2] : normal S1 and S2 [No Murmur] : no murmur heard [Normal Insight/Judgement] : insight and judgment were intact [Normal Affect] : the affect was normal

## 2023-03-06 NOTE — HISTORY OF PRESENT ILLNESS
[FreeTextEntry1] : follow up in HTN [de-identified] : Mr. MATTHEW MORRISON is a pleasant 27 year old male with PMH of HTN, adjustment disorder who comes in to the office to follow up in his HTN. Denies chest pain, headaches, shortness of breath, leg welling.\par No complaints today. Denies suicidal thought.\par Saw cardio for abnormal EKG, was told to continue current regimen.\par \par Previous PCP: ?\par Cardio: Yg

## 2023-03-06 NOTE — PLAN
[FreeTextEntry1] : \par In regards to hypertension\par Patient's blood pressure in adequate range. \par DIscussed avoid using salt, monitoring his blood pressures at home and bring the log on next visit.\par Continue Amlodipine 5 mg \par \par Return to care: within 3 months for follow up or earlier if needed\par Call or return for any questions

## 2023-07-10 ENCOUNTER — APPOINTMENT (OUTPATIENT)
Dept: FAMILY MEDICINE | Facility: CLINIC | Age: 28
End: 2023-07-10

## 2023-08-17 ENCOUNTER — APPOINTMENT (OUTPATIENT)
Dept: FAMILY MEDICINE | Facility: CLINIC | Age: 28
End: 2023-08-17
Payer: COMMERCIAL

## 2023-08-17 VITALS
BODY MASS INDEX: 24.25 KG/M2 | WEIGHT: 160 LBS | RESPIRATION RATE: 16 BRPM | SYSTOLIC BLOOD PRESSURE: 139 MMHG | HEART RATE: 84 BPM | DIASTOLIC BLOOD PRESSURE: 82 MMHG | HEIGHT: 68 IN

## 2023-08-17 DIAGNOSIS — F43.22 ADJUSTMENT DISORDER WITH ANXIETY: ICD-10-CM

## 2023-08-17 PROCEDURE — 99214 OFFICE O/P EST MOD 30 MIN: CPT

## 2023-08-17 NOTE — HISTORY OF PRESENT ILLNESS
Concern for delay in diagnosis and treatment [FreeTextEntry1] : Follow up [de-identified] : Mr. MATTHEW MORRISON is a pleasant 27 year old male with PMH of HTN, adjustment disorder who comes into the office to follow up in his HTN. Denies chest pain, headaches, shortness of breath, leg welling. Reports that at home is consistently < 130/80 No complaints today. Denies suicidal thought. Saw cardio for abnormal EKG (apr/2022), was told to continue current regimen.  Previous PCP: ? Cardio: Yg

## 2023-08-17 NOTE — HEALTH RISK ASSESSMENT
[0] : 2) Feeling down, depressed, or hopeless: Not at all (0) [PHQ-2 Negative - No further assessment needed] : PHQ-2 Negative - No further assessment needed [[id number]] : 0

## 2023-08-17 NOTE — PHYSICAL EXAM
[Normal] : affect was normal and insight and judgment were intact [No Carotid Bruits] : no carotid bruits [No Edema] : there was no peripheral edema

## 2023-09-21 ENCOUNTER — APPOINTMENT (OUTPATIENT)
Dept: CARDIOLOGY | Facility: CLINIC | Age: 28
End: 2023-09-21
Payer: COMMERCIAL

## 2023-09-21 ENCOUNTER — NON-APPOINTMENT (OUTPATIENT)
Age: 28
End: 2023-09-21

## 2023-09-21 VITALS
WEIGHT: 156 LBS | SYSTOLIC BLOOD PRESSURE: 127 MMHG | BODY MASS INDEX: 23.64 KG/M2 | HEART RATE: 69 BPM | HEIGHT: 68 IN | RESPIRATION RATE: 16 BRPM | DIASTOLIC BLOOD PRESSURE: 86 MMHG

## 2023-09-21 DIAGNOSIS — R94.39 ABNORMAL RESULT OF OTHER CARDIOVASCULAR FUNCTION STUDY: ICD-10-CM

## 2023-09-21 PROCEDURE — 99213 OFFICE O/P EST LOW 20 MIN: CPT | Mod: 25

## 2023-09-21 PROCEDURE — 93000 ELECTROCARDIOGRAM COMPLETE: CPT

## 2023-11-29 ENCOUNTER — OFFICE (OUTPATIENT)
Dept: URBAN - METROPOLITAN AREA CLINIC 104 | Facility: CLINIC | Age: 28
Setting detail: OPHTHALMOLOGY
End: 2023-11-29
Payer: COMMERCIAL

## 2023-11-29 DIAGNOSIS — H01.004: ICD-10-CM

## 2023-11-29 DIAGNOSIS — H01.002: ICD-10-CM

## 2023-11-29 DIAGNOSIS — H01.001: ICD-10-CM

## 2023-11-29 DIAGNOSIS — H01.005: ICD-10-CM

## 2023-11-29 PROCEDURE — 92002 INTRM OPH EXAM NEW PATIENT: CPT | Performed by: OPTOMETRIST

## 2023-11-29 ASSESSMENT — LID EXAM ASSESSMENTS
OD_BLEPHARITIS: RLL RUL 1+
OS_BLEPHARITIS: LLL LUL 1+

## 2023-11-29 ASSESSMENT — REFRACTION_CURRENTRX
OS_SPHERE: -1.25
OS_OVR_VA: 20/
OS_AXIS: 77
OD_CYLINDER: SPH
OD_VPRISM_DIRECTION: SV
OS_CYLINDER: -0.50
OD_OVR_VA: 20/
OD_SPHERE: -1.00
OS_VPRISM_DIRECTION: SV

## 2023-11-29 ASSESSMENT — SPHEQUIV_DERIVED
OS_SPHEQUIV: -1.25
OD_SPHEQUIV: -1.375

## 2023-11-29 ASSESSMENT — CONFRONTATIONAL VISUAL FIELD TEST (CVF)
OD_FINDINGS: FULL
OS_FINDINGS: FULL

## 2023-11-29 ASSESSMENT — REFRACTION_AUTOREFRACTION
OD_CYLINDER: -0.25
OD_SPHERE: -1.25
OD_AXIS: 009
OS_SPHERE: -1.00
OS_AXIS: 090
OS_CYLINDER: -0.50

## 2024-01-03 ENCOUNTER — APPOINTMENT (OUTPATIENT)
Dept: FAMILY MEDICINE | Facility: CLINIC | Age: 29
End: 2024-01-03
Payer: COMMERCIAL

## 2024-01-03 VITALS
WEIGHT: 156 LBS | HEIGHT: 68 IN | DIASTOLIC BLOOD PRESSURE: 85 MMHG | BODY MASS INDEX: 23.64 KG/M2 | SYSTOLIC BLOOD PRESSURE: 135 MMHG | HEART RATE: 67 BPM

## 2024-01-03 DIAGNOSIS — Z00.00 ENCOUNTER FOR GENERAL ADULT MEDICAL EXAMINATION W/OUT ABNORMAL FINDINGS: ICD-10-CM

## 2024-01-03 PROCEDURE — 99213 OFFICE O/P EST LOW 20 MIN: CPT | Mod: 25

## 2024-01-03 PROCEDURE — 36415 COLL VENOUS BLD VENIPUNCTURE: CPT

## 2024-01-03 PROCEDURE — 99395 PREV VISIT EST AGE 18-39: CPT | Mod: 25

## 2024-01-03 NOTE — DATA REVIEWED
[FreeTextEntry1] : Additional time spent outside of H&P in I reviewing information including but not limited to previous notes and laboratories for this patient.

## 2024-01-03 NOTE — PLAN
[FreeTextEntry1] :  In regards annual physical exam:  Advised to use sunscreen.  Not interested in flu vaccine. Ordering CBC, CMP, lipids per cardio recommendations. Depression screen: PHQ-2 test done, < 2 as noted above. AUDIT-C test done, negative as noted above. Advised to see optometrist once a year and dentist every 6 months.  Hypertension Patient's blood pressure in adequate range Discussed avoid using salt, monitoring his blood pressures at home and bring the log on next visit. Continue Amlodipine 5 mg   Return to care: within 3-6 months for follow up or earlier if needed. Call or return for any questions

## 2024-01-03 NOTE — HEALTH RISK ASSESSMENT
[0] : 2) Feeling down, depressed, or hopeless: Not at all (0) [PHQ-2 Negative - No further assessment needed] : PHQ-2 Negative - No further assessment needed [Yes] : Yes [Monthly or less (1 pt)] : Monthly or less (1 point) [1 or 2 (0 pts)] : 1 or 2 (0 points) [Never (0 pts)] : Never (0 points) [No] : In the past 12 months have you used drugs other than those required for medical reasons? No [Never] : Never [Audit-CScore] : 1 [[id number]] : 0

## 2024-01-03 NOTE — PHYSICAL EXAM
[No Acute Distress] : no acute distress [Well Nourished] : well nourished [Well Developed] : well developed [Well-Appearing] : well-appearing [Normal Voice/Communication] : normal voice/communication [Normal Sclera/Conjunctiva] : normal sclera/conjunctiva [PERRL] : pupils equal round and reactive to light [EOMI] : extraocular movements intact [Normal Outer Ear/Nose] : the outer ears and nose were normal in appearance [Normal Oropharynx] : the oropharynx was normal [No JVD] : no jugular venous distention [No Lymphadenopathy] : no lymphadenopathy [Supple] : supple [Thyroid Normal, No Nodules] : the thyroid was normal and there were no nodules present [No Respiratory Distress] : no respiratory distress  [No Accessory Muscle Use] : no accessory muscle use [Clear to Auscultation] : lungs were clear to auscultation bilaterally [Normal Rate] : normal rate  [Regular Rhythm] : with a regular rhythm [Normal S1, S2] : normal S1 and S2 [No Murmur] : no murmur heard [No Carotid Bruits] : no carotid bruits [No Abdominal Bruit] : a ~M bruit was not heard ~T in the abdomen [No Varicosities] : no varicosities [Pedal Pulses Present] : the pedal pulses are present [No Edema] : there was no peripheral edema [No Palpable Aorta] : no palpable aorta [No Extremity Clubbing/Cyanosis] : no extremity clubbing/cyanosis [Soft] : abdomen soft [Non Tender] : non-tender [Non-distended] : non-distended [No Masses] : no abdominal mass palpated [No HSM] : no HSM [Normal Bowel Sounds] : normal bowel sounds [Normal Posterior Cervical Nodes] : no posterior cervical lymphadenopathy [Normal Anterior Cervical Nodes] : no anterior cervical lymphadenopathy [No CVA Tenderness] : no CVA  tenderness [No Spinal Tenderness] : no spinal tenderness [No Joint Swelling] : no joint swelling [Grossly Normal Strength/Tone] : grossly normal strength/tone [No Rash] : no rash [Coordination Grossly Intact] : coordination grossly intact [No Focal Deficits] : no focal deficits [Normal Gait] : normal gait [Deep Tendon Reflexes (DTR)] : deep tendon reflexes were 2+ and symmetric [Speech Grossly Normal] : speech grossly normal [Normal Affect] : the affect was normal [Normal Mood] : the mood was normal [Normal Insight/Judgement] : insight and judgment were intact [de-identified] : declined

## 2024-01-03 NOTE — REASON FOR VISIT
[Annual Wellness Visit] : an annual wellness visit [FreeTextEntry1] : Patient is here for his annual physical. Patient states he is well and no complaints

## 2024-01-04 LAB
ALBUMIN SERPL ELPH-MCNC: 5 G/DL
ALP BLD-CCNC: 64 U/L
ALT SERPL-CCNC: 13 U/L
ANION GAP SERPL CALC-SCNC: 11 MMOL/L
AST SERPL-CCNC: 18 U/L
BASOPHILS # BLD AUTO: 0.02 K/UL
BASOPHILS NFR BLD AUTO: 0.5 %
BILIRUB SERPL-MCNC: 0.5 MG/DL
BUN SERPL-MCNC: 18 MG/DL
CALCIUM SERPL-MCNC: 9.8 MG/DL
CHLORIDE SERPL-SCNC: 103 MMOL/L
CHOLEST SERPL-MCNC: 214 MG/DL
CO2 SERPL-SCNC: 25 MMOL/L
CREAT SERPL-MCNC: 1.08 MG/DL
EGFR: 95 ML/MIN/1.73M2
EOSINOPHIL # BLD AUTO: 0.18 K/UL
EOSINOPHIL NFR BLD AUTO: 4.1 %
GLUCOSE SERPL-MCNC: 88 MG/DL
HCT VFR BLD CALC: 48.1 %
HDLC SERPL-MCNC: 83 MG/DL
HGB BLD-MCNC: 16.1 G/DL
IMM GRANULOCYTES NFR BLD AUTO: 0.2 %
LDLC SERPL CALC-MCNC: 120 MG/DL
LYMPHOCYTES # BLD AUTO: 1.53 K/UL
LYMPHOCYTES NFR BLD AUTO: 34.5 %
MAN DIFF?: NORMAL
MCHC RBC-ENTMCNC: 31.8 PG
MCHC RBC-ENTMCNC: 33.5 GM/DL
MCV RBC AUTO: 95.1 FL
MONOCYTES # BLD AUTO: 0.28 K/UL
MONOCYTES NFR BLD AUTO: 6.3 %
NEUTROPHILS # BLD AUTO: 2.41 K/UL
NEUTROPHILS NFR BLD AUTO: 54.4 %
NONHDLC SERPL-MCNC: 131 MG/DL
PLATELET # BLD AUTO: 165 K/UL
POTASSIUM SERPL-SCNC: 4.7 MMOL/L
PROT SERPL-MCNC: 7.3 G/DL
RBC # BLD: 5.06 M/UL
RBC # FLD: 12.3 %
SODIUM SERPL-SCNC: 139 MMOL/L
TRIGL SERPL-MCNC: 61 MG/DL
WBC # FLD AUTO: 4.43 K/UL

## 2024-04-10 ENCOUNTER — APPOINTMENT (OUTPATIENT)
Dept: FAMILY MEDICINE | Facility: CLINIC | Age: 29
End: 2024-04-10

## 2024-07-02 ENCOUNTER — APPOINTMENT (OUTPATIENT)
Dept: FAMILY MEDICINE | Facility: CLINIC | Age: 29
End: 2024-07-02
Payer: COMMERCIAL

## 2024-07-02 VITALS
HEART RATE: 71 BPM | HEIGHT: 68 IN | SYSTOLIC BLOOD PRESSURE: 135 MMHG | WEIGHT: 156 LBS | BODY MASS INDEX: 23.64 KG/M2 | DIASTOLIC BLOOD PRESSURE: 85 MMHG

## 2024-07-02 DIAGNOSIS — I10 ESSENTIAL (PRIMARY) HYPERTENSION: ICD-10-CM

## 2024-07-02 DIAGNOSIS — Z02.0 ENCOUNTER FOR EXAMINATION FOR ADMISSION TO EDUCATIONAL INSTITUTION: ICD-10-CM

## 2024-07-02 PROCEDURE — G2211 COMPLEX E/M VISIT ADD ON: CPT

## 2024-07-02 PROCEDURE — 99214 OFFICE O/P EST MOD 30 MIN: CPT

## 2025-04-22 ENCOUNTER — APPOINTMENT (OUTPATIENT)
Dept: INTERNAL MEDICINE | Facility: CLINIC | Age: 30
End: 2025-04-22
Payer: MEDICAID

## 2025-04-22 VITALS
HEART RATE: 73 BPM | OXYGEN SATURATION: 98 % | DIASTOLIC BLOOD PRESSURE: 75 MMHG | HEIGHT: 68 IN | SYSTOLIC BLOOD PRESSURE: 130 MMHG | WEIGHT: 151 LBS | BODY MASS INDEX: 22.88 KG/M2

## 2025-04-22 DIAGNOSIS — I10 ESSENTIAL (PRIMARY) HYPERTENSION: ICD-10-CM

## 2025-04-22 PROCEDURE — G2211 COMPLEX E/M VISIT ADD ON: CPT | Mod: NC

## 2025-04-22 PROCEDURE — 99214 OFFICE O/P EST MOD 30 MIN: CPT
